# Patient Record
Sex: MALE | Race: WHITE | Employment: OTHER | URBAN - NONMETROPOLITAN AREA
[De-identification: names, ages, dates, MRNs, and addresses within clinical notes are randomized per-mention and may not be internally consistent; named-entity substitution may affect disease eponyms.]

---

## 2017-01-04 ENCOUNTER — PROCEDURE VISIT (OUTPATIENT)
Dept: CARDIOLOGY | Age: 82
End: 2017-01-04

## 2017-01-04 DIAGNOSIS — I50.22 CHRONIC SYSTOLIC CONGESTIVE HEART FAILURE (HCC): Primary | ICD-10-CM

## 2017-01-04 PROCEDURE — 93297 REM INTERROG DEV EVAL ICPMS: CPT | Performed by: INTERNAL MEDICINE

## 2017-02-01 ENCOUNTER — PROCEDURE VISIT (OUTPATIENT)
Dept: CARDIOLOGY | Age: 82
End: 2017-02-01

## 2017-02-01 DIAGNOSIS — I50.22 CHRONIC SYSTOLIC CONGESTIVE HEART FAILURE (HCC): Primary | ICD-10-CM

## 2017-02-01 PROCEDURE — 93297 REM INTERROG DEV EVAL ICPMS: CPT | Performed by: INTERNAL MEDICINE

## 2017-05-18 ENCOUNTER — OFFICE VISIT (OUTPATIENT)
Dept: CARDIOLOGY | Age: 82
End: 2017-05-18

## 2017-05-18 VITALS
WEIGHT: 184 LBS | HEIGHT: 70 IN | BODY MASS INDEX: 26.34 KG/M2 | DIASTOLIC BLOOD PRESSURE: 72 MMHG | SYSTOLIC BLOOD PRESSURE: 134 MMHG | HEART RATE: 63 BPM

## 2017-05-18 DIAGNOSIS — I50.22 CHRONIC SYSTOLIC CONGESTIVE HEART FAILURE (HCC): ICD-10-CM

## 2017-05-18 DIAGNOSIS — I42.9 CARDIOMYOPATHY (HCC): Primary | ICD-10-CM

## 2017-05-18 DIAGNOSIS — Z98.890 S/P CARDIAC CATH: ICD-10-CM

## 2017-05-18 DIAGNOSIS — I10 UNCONTROLLED HYPERTENSION: ICD-10-CM

## 2017-05-18 DIAGNOSIS — I48.92 ATRIAL FLUTTER, PAROXYSMAL (HCC): ICD-10-CM

## 2017-05-18 PROCEDURE — 4040F PNEUMOC VAC/ADMIN/RCVD: CPT | Performed by: INTERNAL MEDICINE

## 2017-05-18 PROCEDURE — G8420 CALC BMI NORM PARAMETERS: HCPCS | Performed by: INTERNAL MEDICINE

## 2017-05-18 PROCEDURE — 1036F TOBACCO NON-USER: CPT | Performed by: INTERNAL MEDICINE

## 2017-05-18 PROCEDURE — G8427 DOCREV CUR MEDS BY ELIG CLIN: HCPCS | Performed by: INTERNAL MEDICINE

## 2017-05-18 PROCEDURE — 1123F ACP DISCUSS/DSCN MKR DOCD: CPT | Performed by: INTERNAL MEDICINE

## 2017-05-18 PROCEDURE — 99213 OFFICE O/P EST LOW 20 MIN: CPT | Performed by: INTERNAL MEDICINE

## 2017-06-30 ENCOUNTER — TELEPHONE (OUTPATIENT)
Dept: CARDIOLOGY | Age: 82
End: 2017-06-30

## 2018-08-23 ENCOUNTER — OFFICE VISIT (OUTPATIENT)
Dept: CARDIOLOGY CLINIC | Age: 83
End: 2018-08-23
Payer: MEDICARE

## 2018-08-23 ENCOUNTER — NURSE ONLY (OUTPATIENT)
Dept: CARDIOLOGY CLINIC | Age: 83
End: 2018-08-23
Payer: MEDICARE

## 2018-08-23 VITALS
DIASTOLIC BLOOD PRESSURE: 57 MMHG | BODY MASS INDEX: 27.63 KG/M2 | HEIGHT: 70 IN | HEART RATE: 91 BPM | WEIGHT: 193 LBS | SYSTOLIC BLOOD PRESSURE: 100 MMHG

## 2018-08-23 DIAGNOSIS — I48.92 ATRIAL FLUTTER, PAROXYSMAL (HCC): ICD-10-CM

## 2018-08-23 DIAGNOSIS — I10 UNCONTROLLED HYPERTENSION: ICD-10-CM

## 2018-08-23 DIAGNOSIS — I47.1 ATRIAL TACHYCARDIA (HCC): ICD-10-CM

## 2018-08-23 DIAGNOSIS — I42.0 DILATED CARDIOMYOPATHY (HCC): Primary | ICD-10-CM

## 2018-08-23 DIAGNOSIS — I48.20 CHRONIC ATRIAL FIBRILLATION (HCC): ICD-10-CM

## 2018-08-23 DIAGNOSIS — Z95.810 S/P ICD (INTERNAL CARDIAC DEFIBRILLATOR) PROCEDURE: ICD-10-CM

## 2018-08-23 DIAGNOSIS — Z95.810 S/P ICD (INTERNAL CARDIAC DEFIBRILLATOR) PROCEDURE: Primary | ICD-10-CM

## 2018-08-23 DIAGNOSIS — Z98.890 S/P CARDIAC CATH: ICD-10-CM

## 2018-08-23 PROCEDURE — G8419 CALC BMI OUT NRM PARAM NOF/U: HCPCS | Performed by: INTERNAL MEDICINE

## 2018-08-23 PROCEDURE — 99214 OFFICE O/P EST MOD 30 MIN: CPT | Performed by: INTERNAL MEDICINE

## 2018-08-23 PROCEDURE — 1101F PT FALLS ASSESS-DOCD LE1/YR: CPT | Performed by: INTERNAL MEDICINE

## 2018-08-23 PROCEDURE — 1123F ACP DISCUSS/DSCN MKR DOCD: CPT | Performed by: INTERNAL MEDICINE

## 2018-08-23 PROCEDURE — G8427 DOCREV CUR MEDS BY ELIG CLIN: HCPCS | Performed by: INTERNAL MEDICINE

## 2018-08-23 PROCEDURE — 4040F PNEUMOC VAC/ADMIN/RCVD: CPT | Performed by: INTERNAL MEDICINE

## 2018-08-23 PROCEDURE — 93000 ELECTROCARDIOGRAM COMPLETE: CPT | Performed by: INTERNAL MEDICINE

## 2018-08-23 PROCEDURE — 1036F TOBACCO NON-USER: CPT | Performed by: INTERNAL MEDICINE

## 2018-08-23 PROCEDURE — 93283 PRGRMG EVAL IMPLANTABLE DFB: CPT | Performed by: INTERNAL MEDICINE

## 2018-08-23 RX ORDER — AMIODARONE HYDROCHLORIDE 200 MG/1
200 TABLET ORAL DAILY
COMMUNITY
End: 2018-08-23

## 2018-08-23 RX ORDER — GINSENG 100 MG
CAPSULE ORAL DAILY
COMMUNITY
End: 2018-12-03 | Stop reason: ALTCHOICE

## 2018-08-23 RX ORDER — DONEPEZIL HYDROCHLORIDE 5 MG/1
10 TABLET, FILM COATED ORAL NIGHTLY
COMMUNITY

## 2018-08-23 RX ORDER — LEVOFLOXACIN 500 MG/1
500 TABLET, FILM COATED ORAL DAILY
COMMUNITY
End: 2018-12-03 | Stop reason: ALTCHOICE

## 2018-08-23 NOTE — PROGRESS NOTES
[facility] dual icd  Dr Violette Moya [age] Years  Fib waves  0.1 mV  r waves 11.8mV  A paced 2.2%  V paced 6.0%  rv threshold  0.5V @ 0.40ms  Atrial impedance 342 ohms  Ventricle impedance 399 ohms, shock 27 ohms, and svc 36 ohms   Time in af 24 hr a day 100%

## 2018-08-23 NOTE — PROGRESS NOTES
children: N/A    Years of education: N/A     Occupational History    Not on file. Social History Main Topics    Smoking status: Never Smoker    Smokeless tobacco: Never Used    Alcohol use No    Drug use: No    Sexual activity: Not on file     Other Topics Concern    Not on file     Social History Narrative    No narrative on file       Current Outpatient Prescriptions   Medication Sig Dispense Refill    donepezil (ARICEPT) 5 MG tablet Take 5 mg by mouth nightly      bacitracin 500 UNIT/GM ointment Apply topically daily Apply topically 2 times daily.  Probiotic Product (PROBIOTIC DAILY PO) Take by mouth 2 times daily      levofloxacin (LEVAQUIN) 500 MG tablet Take 500 mg by mouth daily      aspirin 81 MG EC tablet Take 81 mg by mouth daily      terazosin (HYTRIN) 10 MG capsule Take 10 mg by mouth daily      pantoprazole (PROTONIX) 40 MG tablet Take 1 tablet by mouth daily 30 tablet 3    metoprolol (TOPROL-XL) 50 MG XL tablet Take 1 tablet by mouth 2 times daily 180 tablet 1    Iron TABS Take 65 mg by mouth daily. No current facility-administered medications for this visit. Review of Systems -     General ROS: negative  Psychological ROS: negative  Hematological and Lymphatic ROS: No history of blood clots or bleeding disorder. Respiratory ROS: no cough, shortness of breath, or wheezing  Cardiovascular ROS: no chest pain or dyspnea on exertion  Gastrointestinal ROS: negative  Genito-Urinary ROS: no dysuria, trouble voiding, or hematuria  Musculoskeletal ROS: negative  Neurological ROS: no TIA or stroke symptoms  Dermatological ROS: negative      Blood pressure (!) 100/57, pulse 91, height 5' 10\" (1.778 m), weight 193 lb (87.5 kg).         Physical Examination:    General appearance - alert, well appearing, and in no distress  Mental status - alert, oriented to person, place, and time  Neck - supple, no significant adenopathy, no JVD, or carotid bruits  Chest - clear to dilated. Mitral valve: There was moderate annular calcification. 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 COMPARISONS:  Comparison was made with the previous study of 10-Yasmany-2014. Ejection   fraction has not changed. RECOMMENDATIONS:  A CT scan of the thoracic aorta to assess for aortic aneurysm is   suggested. Prepared and signed by    Josh Chand MD  Signed 10-Sep-2014 20:38:44    EKG 10/27/14-Sinus  Rhythm  -First degree A-V block  - frequent multiform ectopic ventricular beats   Tanner = 238   # VECs = 3, # types 2  -Nonspecific ST depression   +   Nonspecific T-abnormality  -Nondiagnostic. ICD interrogation-revealed  Paroxysmal atrial flutter noted on ICD interrogation  · AT/AF >= 6 hr for 1 days. EKG 11/12/15-  Sinus  Rhythm   -  Nonspecific T-abnormality. ABNORMAL     EKG 8/23/18  Atrial flutter-fibrillation  - occasional ectopic ventricular beat     -Nonspecific ST depression   +   Nonspecific T-abnormality  -Nondiagnostic. ABNORMAL       Assessment     Diagnosis Orders   1. Dilated cardiomyopathy ([address]     2. Hypertension  EKG 12 Lead   3. Chronic atrial fibrillation ([address]     4. S/P ICD (internal cardiac defibrillator) procedure: 10/16/2014: Medtronic Dual Chamber. 5. S/P cardiac cath-7/9/14-LM-P, LAD  MID 30 TO 40%, DIAG PROX 40 TO 50% MID 30%, LCX MID 40%, OM1 MID 30%, RCA PROX 30%, MID 30%, PLV OSTIAL 50%, EDP 22, no LVG- MED RX     6. Atrial tachycardia - nonsustained with frequent PACs, no atrial flutter     7. Atrial flutter, paroxysmal (HCC) noted on the ICD interrogation- short lasting - med RX and cont asa         ASSESSMENT:   1. Acute systolic congestive heart failure, basically systolic and   diastolic in nature with shortness of breath, bilateral lower leg   swelling and chest x-ray abnormality and increased JVD. The patient   is appropriately on gentle diuresis. We will continue with gentle   diuresis.    2. This is a newly diagnosed cardiomyopathy with ejection fraction 30-35   percent with a background of newly diagnosed acute congestive heart   failure. 3. Hypertension, background history of long history of hypertension. 4. Anemia, microcystic, hypochromic with low MCV and low MCH, need to   evaluate blood loss and iron study needs to be performed. 5. Benign prostatic hypertrophy. 6. Impaired hearing. Plan     Recent admission for GI bleed  Family member with him she does not know much about him  And can not make decision  Advised to come with his kids for Future    Current Lab  Reviewed and doing well    Continue the current treatment and with constant vigilance to changes in symptoms and also any potential side effects. Return for care or seek medical attention immediately if symptoms got worse and/or develop new symptoms. Congestive heart failure: no evidence of fluid overload today, no recent hospitalization for CHF    Cardiomyopathy:Nonischemic  improving, no CHF symptoms, no change in clinical condition. Will need periodic echocardiograms depending on symptoms. CMP- NON-ISCHEMIC -OMT  And had ICD  Cont lisinopril  5 MG po qd  Cont current coreg dose  Echo recommended and pat declined     PA suresh- chiarads of 3 - need OA  Off  apixaban 5 mg po bid due to recent GI bleed  Risk and benefit of the OA explained and pat verbalized understanding and declined  He know that there is a risk of CVA    Hypertension, on medical treatment. Seems to be under good control. Patient is compliant with medical treatment.        D/w the pat the cath finding  Need icd intrrogation   Uma Mckeon agreed today to have after refusal since June 2017    D/w the pat and wife at length    [place] [id number]

## 2018-11-21 ENCOUNTER — TELEPHONE (OUTPATIENT)
Dept: CARDIOLOGY CLINIC | Age: 83
End: 2018-11-21

## 2018-12-03 ENCOUNTER — OFFICE VISIT (OUTPATIENT)
Dept: CARDIOLOGY CLINIC | Age: 83
End: 2018-12-03
Payer: MEDICARE

## 2018-12-03 ENCOUNTER — NURSE ONLY (OUTPATIENT)
Dept: CARDIOLOGY CLINIC | Age: 83
End: 2018-12-03
Payer: MEDICARE

## 2018-12-03 VITALS
SYSTOLIC BLOOD PRESSURE: 122 MMHG | DIASTOLIC BLOOD PRESSURE: 68 MMHG | BODY MASS INDEX: 24.77 KG/M2 | HEIGHT: 70 IN | HEART RATE: 60 BPM | WEIGHT: 173 LBS

## 2018-12-03 DIAGNOSIS — I48.20 CHRONIC ATRIAL FIBRILLATION (HCC): ICD-10-CM

## 2018-12-03 DIAGNOSIS — I50.42 CHRONIC COMBINED SYSTOLIC AND DIASTOLIC CONGESTIVE HEART FAILURE (HCC): ICD-10-CM

## 2018-12-03 DIAGNOSIS — I10 UNCONTROLLED HYPERTENSION: ICD-10-CM

## 2018-12-03 DIAGNOSIS — I42.0 DILATED CARDIOMYOPATHY (HCC): Primary | ICD-10-CM

## 2018-12-03 DIAGNOSIS — Z95.810 S/P ICD (INTERNAL CARDIAC DEFIBRILLATOR) PROCEDURE: ICD-10-CM

## 2018-12-03 DIAGNOSIS — Z98.890 S/P CARDIAC CATH: ICD-10-CM

## 2018-12-03 DIAGNOSIS — Z95.810 S/P ICD (INTERNAL CARDIAC DEFIBRILLATOR) PROCEDURE: Primary | ICD-10-CM

## 2018-12-03 PROCEDURE — G8484 FLU IMMUNIZE NO ADMIN: HCPCS | Performed by: INTERNAL MEDICINE

## 2018-12-03 PROCEDURE — 4040F PNEUMOC VAC/ADMIN/RCVD: CPT | Performed by: INTERNAL MEDICINE

## 2018-12-03 PROCEDURE — 1101F PT FALLS ASSESS-DOCD LE1/YR: CPT | Performed by: INTERNAL MEDICINE

## 2018-12-03 PROCEDURE — 93283 PRGRMG EVAL IMPLANTABLE DFB: CPT | Performed by: INTERNAL MEDICINE

## 2018-12-03 PROCEDURE — 1123F ACP DISCUSS/DSCN MKR DOCD: CPT | Performed by: INTERNAL MEDICINE

## 2018-12-03 PROCEDURE — G8420 CALC BMI NORM PARAMETERS: HCPCS | Performed by: INTERNAL MEDICINE

## 2018-12-03 PROCEDURE — G8427 DOCREV CUR MEDS BY ELIG CLIN: HCPCS | Performed by: INTERNAL MEDICINE

## 2018-12-03 PROCEDURE — 99214 OFFICE O/P EST MOD 30 MIN: CPT | Performed by: INTERNAL MEDICINE

## 2018-12-03 PROCEDURE — 1036F TOBACCO NON-USER: CPT | Performed by: INTERNAL MEDICINE

## 2018-12-03 RX ORDER — BUMETANIDE 1 MG/1
0.5 TABLET ORAL DAILY
COMMUNITY

## 2018-12-03 RX ORDER — POTASSIUM CHLORIDE 750 MG/1
10 TABLET, EXTENDED RELEASE ORAL 2 TIMES DAILY
Qty: 60 TABLET | Refills: 3
Start: 2018-12-03 | End: 2019-05-06

## 2018-12-03 RX ORDER — POTASSIUM CHLORIDE 750 MG/1
10 TABLET, EXTENDED RELEASE ORAL DAILY
COMMUNITY
End: 2018-12-03

## 2018-12-03 NOTE — PROGRESS NOTES
valve:  There was moderate annular calcification. 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 COMPARISONS:  Comparison was made with the previous study of 10-Yasmany-2014. Ejection   fraction has not changed. RECOMMENDATIONS:  A CT scan of the thoracic aorta to assess for aortic aneurysm is   suggested. Prepared and signed by    Yg Potter MD  Signed 10-Sep-2014 20:38:44    EKG 10/27/14-Sinus  Rhythm  -First degree A-V block  - frequent multiform ectopic ventricular beats   Tanner = 238   # VECs = 3, # types 2  -Nonspecific ST depression   +   Nonspecific T-abnormality  -Nondiagnostic. ICD interrogation-revealed  Paroxysmal atrial flutter noted on ICD interrogation  · AT/AF >= 6 hr for 1 days. EKG 11/12/15-  Sinus  Rhythm   -  Nonspecific T-abnormality. ABNORMAL     EKG 8/23/18  Atrial flutter-fibrillation  - occasional ectopic ventricular beat     -Nonspecific ST depression   +   Nonspecific T-abnormality  -Nondiagnostic. ABNORMAL       Assessment     Diagnosis Orders   1. Dilated cardiomyopathy ([address]     2. Chronic combined systolic and diastolic congestive heart failure ([address]     3. Chronic atrial fibrillation ([address]     4. Hypertension     5. S/P cardiac cath-7/9/14-LM-P, LAD  MID 30 TO 40%, DIAG PROX 40 TO 50% MID 30%, LCX MID 40%, OM1 MID 30%, RCA PROX 30%, MID 30%, PLV OSTIAL 50%, EDP 22, no LVG- MED RX     6. S/P ICD (internal cardiac defibrillator) procedure: 10/16/2014: Medtronic Dual Chamber. ASSESSMENT:   1. Acute systolic congestive heart failure, basically systolic and   diastolic in nature with shortness of breath, bilateral lower leg   swelling and chest x-ray abnormality and increased JVD. The patient   is appropriately on gentle diuresis. We will continue with gentle   diuresis.    2. This is a newly diagnosed cardiomyopathy with ejection fraction 30-35   percent with a background of newly diagnosed acute congestive heart

## 2018-12-03 NOTE — PROGRESS NOTES
[employer] dual icd  Dr Teddy Barrera 7. 4years  Atrial paced 0.5%  V Paced 8.5%  p wave - fib waves  r wave 17. 5mV  Ventricle threshold 0.5V @ 0.8ms  Atrial impedance 361 ohms  Ventricle impedance 361 ohms  RV 33 ohms  SVC 39 ohms  A fib burden 100%  Episodes of svt  Changes made from 0.4ms to 0.8ms in parameters

## 2019-02-12 ENCOUNTER — PROCEDURE VISIT (OUTPATIENT)
Dept: CARDIOLOGY CLINIC | Age: 84
End: 2019-02-12
Payer: MEDICARE

## 2019-02-12 DIAGNOSIS — Z95.810 S/P ICD (INTERNAL CARDIAC DEFIBRILLATOR) PROCEDURE: ICD-10-CM

## 2019-02-12 DIAGNOSIS — I50.20 SYSTOLIC CONGESTIVE HEART FAILURE, UNSPECIFIED HF CHRONICITY (HCC): Primary | ICD-10-CM

## 2019-02-12 PROCEDURE — 93297 REM INTERROG DEV EVAL ICPMS: CPT | Performed by: INTERNAL MEDICINE

## 2019-02-12 PROCEDURE — 93299 PR REM INTERROG ICPMS/SCRMS <30 D TECH REVIEW: CPT | Performed by: INTERNAL MEDICINE

## 2019-03-20 ENCOUNTER — PROCEDURE VISIT (OUTPATIENT)
Dept: CARDIOLOGY CLINIC | Age: 84
End: 2019-03-20
Payer: MEDICARE

## 2019-03-20 DIAGNOSIS — I50.20 SYSTOLIC CONGESTIVE HEART FAILURE, UNSPECIFIED HF CHRONICITY (HCC): Primary | ICD-10-CM

## 2019-03-20 PROCEDURE — 93297 REM INTERROG DEV EVAL ICPMS: CPT | Performed by: INTERNAL MEDICINE

## 2019-03-20 PROCEDURE — 93299 PR REM INTERROG ICPMS/SCRMS <30 D TECH REVIEW: CPT | Performed by: INTERNAL MEDICINE

## 2019-04-18 ENCOUNTER — OUTSIDE SERVICES (OUTPATIENT)
Dept: FAMILY MEDICINE CLINIC | Age: 84
End: 2019-04-18
Payer: MEDICARE

## 2019-04-18 VITALS
OXYGEN SATURATION: 97 % | SYSTOLIC BLOOD PRESSURE: 136 MMHG | WEIGHT: 175 LBS | BODY MASS INDEX: 25.11 KG/M2 | TEMPERATURE: 98 F | RESPIRATION RATE: 20 BRPM | DIASTOLIC BLOOD PRESSURE: 70 MMHG | HEART RATE: 72 BPM

## 2019-04-18 DIAGNOSIS — I48.20 CHRONIC ATRIAL FIBRILLATION (HCC): ICD-10-CM

## 2019-04-18 DIAGNOSIS — N40.1 BENIGN PROSTATIC HYPERPLASIA WITH URINARY HESITANCY: ICD-10-CM

## 2019-04-18 DIAGNOSIS — I50.20 SYSTOLIC CONGESTIVE HEART FAILURE, UNSPECIFIED HF CHRONICITY (HCC): ICD-10-CM

## 2019-04-18 DIAGNOSIS — Z95.810 S/P ICD (INTERNAL CARDIAC DEFIBRILLATOR) PROCEDURE: ICD-10-CM

## 2019-04-18 DIAGNOSIS — K92.2 LOWER GI BLEED: ICD-10-CM

## 2019-04-18 DIAGNOSIS — R39.11 BENIGN PROSTATIC HYPERPLASIA WITH URINARY HESITANCY: ICD-10-CM

## 2019-04-18 DIAGNOSIS — Z98.890 S/P CARDIAC CATH: ICD-10-CM

## 2019-04-18 DIAGNOSIS — D64.9 ANEMIA, UNSPECIFIED TYPE: ICD-10-CM

## 2019-04-18 DIAGNOSIS — I48.92 ATRIAL FLUTTER, PAROXYSMAL (HCC): ICD-10-CM

## 2019-04-18 DIAGNOSIS — I47.1 ATRIAL TACHYCARDIA (HCC): ICD-10-CM

## 2019-04-18 DIAGNOSIS — F03.90 DEMENTIA WITHOUT BEHAVIORAL DISTURBANCE, UNSPECIFIED DEMENTIA TYPE: Primary | ICD-10-CM

## 2019-04-18 DIAGNOSIS — I42.0 DILATED CARDIOMYOPATHY (HCC): ICD-10-CM

## 2019-04-18 DIAGNOSIS — I10 UNCONTROLLED HYPERTENSION: ICD-10-CM

## 2019-04-18 PROCEDURE — 99309 SBSQ NF CARE MODERATE MDM 30: CPT | Performed by: PHYSICAL MEDICINE & REHABILITATION

## 2019-04-18 ASSESSMENT — ENCOUNTER SYMPTOMS
SORE THROAT: 0
DIARRHEA: 0
CONSTIPATION: 0
COUGH: 0
VOMITING: 0
EYE REDNESS: 0
NAUSEA: 0
SHORTNESS OF BREATH: 0
WHEEZING: 0
RHINORRHEA: 0

## 2019-04-18 NOTE — PROGRESS NOTES
Bhaskar Carter is a 81 y.o. male who presents today for his medical conditions/complaints as noted below. Chief Complaint   Patient presents with    Other     monthly visit           HPI:     HPI   Patient is seen and examined today in his room for his chronic health conditions. He denies pain, short of breath or chest pain. Denies headache, nausea, and vomiting. Staff continue to monitor his behaviors after the addition of Depakote. He was quiet and non-talkative during his visit. His weight is stable at 175# this month. He has a PMH of HTN, GERD, diverticulitis, BPH, OA, right shoulder impingement syndrome, CHF, non-obstructive CAD, A-fib, aortic aneurysm, HTN, hematuria, dementia, reflux. He is VERY [place]. Past Medical History:   Diagnosis Date    CAD (coronary artery disease)     CHF (congestive heart failure) (HCC)     Hx of blood clots     groin    Hypertension     Paroxysmal atrial fibrillation ([facility] 10/24/2014    S/P ICD (internal cardiac defibrillator) procedure: 10/16/2014: Medtronic Dual Chamber. 10/16/2014    10/16/2014: Medtronic Dual Chamber.  Dr. Iris Erickson       Past Surgical History:   Procedure Laterality Date    CARDIAC CATHETERIZATION  7/9/14    COLONOSCOPY      x2    JOINT REPLACEMENT Bilateral     Hip replacement    OTHER SURGICAL HISTORY      mole removed       Family History   Problem Relation Age of Onset    Heart Disease Mother     Cancer Sister     Heart Disease Father        Social History     Tobacco Use    Smoking status: Never Smoker    Smokeless tobacco: Never Used   Substance Use Topics    Alcohol use: No      Current Outpatient Medications   Medication Sig Dispense Refill    bumetanide (BUMEX) 1 MG tablet Take 1 mg by mouth 2 times daily      potassium chloride (KLOR-CON M) 10 MEQ extended release tablet Take 1 tablet by mouth 2 times daily 60 tablet 3    donepezil (ARICEPT) 5 MG tablet Take 5 mg by mouth nightly      aspirin 81 MG EC tablet Take 81 mg by mouth daily      terazosin (HYTRIN) 10 MG capsule Take 10 mg by mouth daily      pantoprazole (PROTONIX) 40 MG tablet Take 1 tablet by mouth daily 30 tablet 3    metoprolol (TOPROL-XL) 50 MG XL tablet Take 1 tablet by mouth 2 times daily (Patient taking differently: Take 25 mg by mouth daily ) 180 tablet 1    Iron TABS Take 65 mg by mouth daily. No current facility-administered medications for this visit. No Known Allergies    Health Maintenance   Topic Date Due    DTaP/Tdap/Td vaccine (1 - Tdap) 01/15/1954    Shingles Vaccine (1 of 2) 01/15/1986    Pneumococcal 65+ years Vaccine (1 of 2 - PCV13) 01/15/2001    Potassium monitoring  08/10/2017    Creatinine monitoring  08/10/2017    Flu vaccine (Season Ended) 09/01/2019       Subjective:      Review of Systems   Constitutional: Negative for chills, fatigue and fever. HENT: Negative for congestion, rhinorrhea and sore throat. Eyes: Negative for redness and visual disturbance. Respiratory: Negative for cough, shortness of breath and wheezing. Cardiovascular: Negative for chest pain and leg swelling. Gastrointestinal: Negative for constipation, diarrhea, nausea and vomiting. Endocrine: Negative for polydipsia and polyuria. Genitourinary: Negative for dysuria, frequency and hematuria. Musculoskeletal: Negative for arthralgias, gait problem and myalgias. Skin: Negative for rash and wound. Allergic/Immunologic: Negative for environmental allergies and immunocompromised state. Neurological: Negative for dizziness, light-headedness and headaches. Hematological: Does not bruise/bleed easily. Psychiatric/Behavioral: Negative for dysphoric mood and sleep disturbance. The patient is not nervous/anxious. Objective:     Physical Exam   Constitutional: He is oriented to person, place, and time. He appears well-developed and well-nourished. No distress. HENT:   Head: Normocephalic and atraumatic.    Right Ear: External ear normal.   Left Ear: External ear normal.   Nose: Nose normal.   Mouth/Throat: Oropharynx is clear and moist and mucous membranes are normal.   Eyes: Conjunctivae and EOM are normal. Right eye exhibits no discharge. Left eye exhibits no discharge. No scleral icterus. Neck: Neck supple. No JVD present. No thyromegaly present. Cardiovascular: Normal rate, regular rhythm, normal heart sounds and intact distal pulses. Pulmonary/Chest: Effort normal and breath sounds normal. No stridor. No respiratory distress. He has no wheezes. He has no rales. Abdominal: Soft. Bowel sounds are normal. He exhibits no distension. There is no tenderness. Musculoskeletal: Normal range of motion. He exhibits no edema or deformity. Right shoulder: He exhibits no tenderness, no bony tenderness and no pain. Left shoulder: He exhibits no tenderness, no bony tenderness and no pain. Cervical back: He exhibits no tenderness, no bony tenderness and no pain. Thoracic back: He exhibits no tenderness, no bony tenderness, no pain and no spasm. Lumbar back: He exhibits no tenderness, no bony tenderness and no pain. Lymphadenopathy:     He has no cervical adenopathy. Right: No supraclavicular adenopathy present. Left: No supraclavicular adenopathy present. Neurological: He is alert and oriented to person, place, and time. He displays no atrophy. He exhibits normal muscle tone. He displays no seizure activity. Skin: Skin is warm, dry and intact. No rash noted. No erythema. Psychiatric: He has a normal mood and affect. His speech is normal and behavior is normal. Cognition and memory are normal.   Nursing note and vitals reviewed. VITALS  /70   Pulse 72   Temp 98 °F (36.7 °C)   Resp 20   Wt 175 lb (79.4 kg)   SpO2 97%   BMI 25.11 kg/m²     Assessment/Plan:   1. A-fib--chronic and stable; continue Metoprolol and ASA. Unable to use anticoagulants due to falls.    2. CHF--chronic; continue Bumex, potassium,and MARCELO diet. Weight stable. Will monitor. [facility]. 3.  ASHD--chronic and stable; continue ASA. No s/s.   4. BPH--chronic and stable; continue Terazosin  5. HTN--chronic and stable; continue Metoprolol, Bumex and Potassium. 6.  GERD--chronic and stable; continue Pantoprazole  7. Dementia--chronic; continue Aricept. 8. Anemia- Chronic and stable. Ferrous sulfate. labs stable. 9. Depression - Continue Zoloft 25mg for 7 days, then increase to 50mg. PHQ-9 to be assessed. Continue Depakote for behaviors.     Marcell Costello M.D.

## 2019-04-24 ENCOUNTER — PROCEDURE VISIT (OUTPATIENT)
Dept: CARDIOLOGY CLINIC | Age: 84
End: 2019-04-24
Payer: MEDICARE

## 2019-04-24 DIAGNOSIS — Z95.810 S/P ICD (INTERNAL CARDIAC DEFIBRILLATOR) PROCEDURE: Primary | ICD-10-CM

## 2019-04-24 PROCEDURE — 93296 REM INTERROG EVL PM/IDS: CPT | Performed by: INTERNAL MEDICINE

## 2019-04-24 PROCEDURE — 93295 DEV INTERROG REMOTE 1/2/MLT: CPT | Performed by: INTERNAL MEDICINE

## 2019-04-24 NOTE — PROGRESS NOTES
At fib, consider VVIR with next in office check   At imped 399  shock 38 SVC 42  R waves 14.1  0.6% atrial paced 22.7% RVP  optivol WNL

## 2019-05-03 LAB
BUN BLDV-MCNC: 20 MG/DL
CALCIUM SERPL-MCNC: 8.1 MG/DL
CHLORIDE BLD-SCNC: 98 MMOL/L
CO2: 32 MMOL/L
CREAT SERPL-MCNC: 1.1 MG/DL
GFR CALCULATED: NORMAL
GLUCOSE BLD-MCNC: 73 MG/DL
MAGNESIUM: 1.9 MG/DL
POTASSIUM SERPL-SCNC: 3.3 MMOL/L
SODIUM BLD-SCNC: 138 MMOL/L

## 2019-05-06 ENCOUNTER — OFFICE VISIT (OUTPATIENT)
Dept: CARDIOLOGY CLINIC | Age: 84
End: 2019-05-06
Payer: MEDICARE

## 2019-05-06 VITALS
DIASTOLIC BLOOD PRESSURE: 58 MMHG | HEIGHT: 68 IN | SYSTOLIC BLOOD PRESSURE: 90 MMHG | BODY MASS INDEX: 26.22 KG/M2 | WEIGHT: 173 LBS | HEART RATE: 51 BPM

## 2019-05-06 DIAGNOSIS — I42.0 DILATED CARDIOMYOPATHY (HCC): ICD-10-CM

## 2019-05-06 DIAGNOSIS — I50.22 CHRONIC SYSTOLIC CONGESTIVE HEART FAILURE (HCC): Primary | ICD-10-CM

## 2019-05-06 DIAGNOSIS — Z95.810 S/P ICD (INTERNAL CARDIAC DEFIBRILLATOR) PROCEDURE: ICD-10-CM

## 2019-05-06 DIAGNOSIS — I10 UNCONTROLLED HYPERTENSION: ICD-10-CM

## 2019-05-06 DIAGNOSIS — Z98.890 S/P CARDIAC CATH: ICD-10-CM

## 2019-05-06 DIAGNOSIS — I48.20 CHRONIC ATRIAL FIBRILLATION (HCC): ICD-10-CM

## 2019-05-06 PROCEDURE — 1123F ACP DISCUSS/DSCN MKR DOCD: CPT | Performed by: INTERNAL MEDICINE

## 2019-05-06 PROCEDURE — G8419 CALC BMI OUT NRM PARAM NOF/U: HCPCS | Performed by: INTERNAL MEDICINE

## 2019-05-06 PROCEDURE — 1036F TOBACCO NON-USER: CPT | Performed by: INTERNAL MEDICINE

## 2019-05-06 PROCEDURE — 99214 OFFICE O/P EST MOD 30 MIN: CPT | Performed by: INTERNAL MEDICINE

## 2019-05-06 PROCEDURE — G8427 DOCREV CUR MEDS BY ELIG CLIN: HCPCS | Performed by: INTERNAL MEDICINE

## 2019-05-06 PROCEDURE — 4040F PNEUMOC VAC/ADMIN/RCVD: CPT | Performed by: INTERNAL MEDICINE

## 2019-05-06 RX ORDER — TERAZOSIN 5 MG/1
5 CAPSULE ORAL NIGHTLY
Qty: 30 CAPSULE | Refills: 3
Start: 2019-05-06

## 2019-05-06 RX ORDER — DIVALPROEX SODIUM 250 MG/1
250 TABLET, EXTENDED RELEASE ORAL 3 TIMES DAILY
COMMUNITY

## 2019-05-06 RX ORDER — POTASSIUM CHLORIDE 750 MG/1
20 TABLET, EXTENDED RELEASE ORAL 2 TIMES DAILY
Qty: 180 TABLET | Refills: 5
Start: 2019-05-06

## 2019-05-06 NOTE — PROGRESS NOTES
Chief Complaint   Patient presents with   Juan Luis Luna   Originally Seen and evaluated in the hospital for CHF and found to have new low EF-treated and D/C  And discharged. REASON FOR CONSULTATION: Reduced LV systolic function with ejection fraction   30 percent in a 67year-old gentleman admitted with acute newly diagnosed   congestive heart failure exacerbation. Had a cath 7/9/14, no stents. Pt here for 5 mo check up   Pt son assist patient with answering questions,   patient hard of hearing,   Son by his side    No dizziness   BP low normal    PATIENT DENIES CP, SOB, PALPITATIONS, DIZZY, LIGHTHEADED AND PERIPHERAL EDEMA. unchanged      Had  ICD placement   Patient Seen, Chart, Consults notes, Labs, Radiology studies reviewed. Patient Active Problem List   Diagnosis    CHF (congestive heart failure) (HCC) systolic well compensated and stable NYHA class II/III    Hypertension    Anemia    BPH (benign prostatic hyperplasia)    Cardiomyopathy ([facility] [address] EF 30%- 06/2014 and repeat ECHO 09/2014 EF 25%- had  ICD    S/P cardiac cath-7/9/14-LM-P, LAD  MID 30 TO 40%, DIAG PROX 40 TO 50% MID 30%, LCX MID 40%, OM1 MID 30%, RCA PROX 30%, MID 30%, PLV OSTIAL 50%, EDP 22, no LVG- MED RX    S/P ICD (internal cardiac defibrillator) procedure: 10/16/2014: Medtronic Dual Chamber.     Atrial tachycardia - nonsustained with frequent PACs, no atrial flutter    Atrial flutter, paroxysmal (HCC) noted on the ICD interrogation- short lasting - med RX and cont asa    Lower GI bleed    Chronic atrial fibrillation (HCC)    Dementia without behavioral disturbance       Past Surgical History:   Procedure Laterality Date    CARDIAC CATHETERIZATION  7/9/14    COLONOSCOPY      x2    JOINT REPLACEMENT Bilateral     Hip replacement    OTHER SURGICAL HISTORY      mole removed       No Known Allergies     Family History   Problem Relation Age of Onset    Heart Disease Mother     Cancer Sister     Heart Disease Father         Social History     Socioeconomic History    Marital status:      Spouse name: Not on file    Number of children: Not on file    Years of education: Not on file    Highest education level: Not on file   Occupational History    Not on file   Social Needs    Financial resource strain: Not on file    Food insecurity:     Worry: Not on file     Inability: Not on file    Transportation needs:     Medical: Not on file     Non-medical: Not on file   Tobacco Use    Smoking status: Never Smoker    Smokeless tobacco: Never Used   Substance and Sexual Activity    Alcohol use: No    Drug use: No    Sexual activity: Not on file   Lifestyle    Physical activity:     Days per week: Not on file     Minutes per session: Not on file    Stress: Not on file   Relationships    Social connections:     Talks on phone: Not on file     Gets together: Not on file     Attends Synagogue service: Not on file     Active member of club or organization: Not on file     Attends meetings of clubs or organizations: Not on file     Relationship status: Not on file    Intimate partner violence:     Fear of current or ex partner: Not on file     Emotionally abused: Not on file     Physically abused: Not on file     Forced sexual activity: Not on file   Other Topics Concern    Not on file   Social History Narrative    Not on file       Current Outpatient Medications   Medication Sig Dispense Refill    divalproex (DEPAKOTE ER) 250 MG extended release tablet Take 250 mg by mouth 3 times daily      sertraline (ZOLOFT) 50 MG tablet Take 50 mg by mouth daily      terazosin (HYTRIN) 5 MG capsule Take 1 capsule by mouth nightly 30 capsule 3    bumetanide (BUMEX) 1 MG tablet Take 1 mg by mouth 2 times daily      potassium chloride (KLOR-CON M) 10 MEQ extended release tablet Take 1 tablet by mouth 2 times daily (Patient taking differently: Take 10 mEq by mouth 3 times daily ) 60 tablet 3    donepezil (ARICEPT) 5 MG tablet Take 5 mg by mouth nightly      aspirin 81 MG EC tablet Take 81 mg by mouth daily      pantoprazole (PROTONIX) 40 MG tablet Take 1 tablet by mouth daily 30 tablet 3    Iron TABS Take 65 mg by mouth daily. No current facility-administered medications for this visit. Review of Systems -     General ROS: negative  Psychological ROS: negative  Hematological and Lymphatic ROS: No history of blood clots or bleeding disorder. Respiratory ROS: no cough, shortness of breath, or wheezing  Cardiovascular ROS: no chest pain or dyspnea on exertion  Gastrointestinal ROS: negative  Genito-Urinary ROS: no dysuria, trouble voiding, or hematuria  Musculoskeletal ROS: negative  Neurological ROS: no TIA or stroke symptoms  Dermatological ROS: negative      Blood pressure (!) 90/58, pulse 51, height 5' 8\" (1.727 m), weight 173 lb (78.5 kg). Physical Examination:    General appearance - alert, well appearing, and in no distress  Mental status - alert, oriented to person, place, and time  Neck - supple, no significant adenopathy, no JVD, or carotid bruits  Chest - clear to auscultation, no wheezes, rales or rhonchi, symmetric air entry  Heart - normal rate, regular rhythm, normal S1, S2, no murmurs, rubs, clicks or gallops  Abdomen - soft, nontender, nondistended, no masses or organomegaly  Neurological - alert, oriented, normal speech, no focal findings or movement disorder noted  Musculoskeletal - no joint tenderness, deformity or swelling  Extremities - peripheral pulses normal, no pedal edema, no clubbing or cyanosis  Skin - normal coloration and turgor, no rashes, no suspicious skin lesions noted    Lab  No results for input(s): CKTOTAL, CKMB, CKMBINDEX, TROPONINI in the last 72 hours.   CBC:   Lab Results   Component Value Date    WBC 6.3 08/11/2016    RBC 3.02 08/11/2016    HGB 9.9 08/11/2016    HCT 29.4 08/11/2016    MCV 97.6 08/11/2016    MCH 32.7 08/11/2016    MCHC 33.5 08/11/2016    RDW 13.9 08/11/2016     08/11/2016    MPV 9.6 08/11/2016     BMP:    Lab Results   Component Value Date     08/10/2016    K 3.8 08/10/2016     08/10/2016    CO2 21 08/10/2016    BUN 24 08/10/2016    LABALBU 3.2 08/10/2016    CREATININE 1.2 08/10/2016    CALCIUM 8.5 08/10/2016    LABGLOM 58 08/10/2016    GLUCOSE 92 08/10/2016     Hepatic Function Panel:    Lab Results   Component Value Date    ALKPHOS 45 08/10/2016     Magnesium:    Lab Results   Component Value Date    MG 2.5 04/28/2015     Warfarin PT/INR:    No components found for: PTPATWAR,  PTINRWAR  HgBA1c:    No results found for: LABA1C  FLP:    No components found for: CHLPL,  TRIG,  HDL,  LDLCALC,  LDLDIRECT,  LABVLDL  TSH:    Lab Results   Component Value Date    TSH 5.810 06/09/2014     SUMMARY:    Left ventricle: The ventricle was mildly dilated. Systolic function was severely reduced. Ejection fraction was estimated to   be 25 %. There was severe diffuse hypokinesis. Wall thickness was mildly increased. The changes were consistent with eccentric hypertrophy. Left atrium:  The atrium was moderately dilated. Mitral valve: There was moderate annular calcification. 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 COMPARISONS:  Comparison was made with the previous study of 10-Yasmany-2014. Ejection   fraction has not changed. RECOMMENDATIONS:  A CT scan of the thoracic aorta to assess for aortic aneurysm is   suggested. Prepared and signed by    Katerina Pate MD  Signed 10-Sep-2014 20:38:44    EKG 10/27/14-Sinus  Rhythm  -First degree A-V block  - frequent multiform ectopic ventricular beats   Tanner = 238   # VECs = 3, # types 2  -Nonspecific ST depression   +   Nonspecific T-abnormality  -Nondiagnostic. ICD interrogation-revealed  Paroxysmal atrial flutter noted on ICD interrogation  · AT/AF >= 6 hr for 1 days. EKG 11/12/15-  Sinus  Rhythm   -  Nonspecific T-abnormality. ABNORMAL     EKG 8/23/18  Atrial flutter-fibrillation  - occasional ectopic ventricular beat     -Nonspecific ST depression   +   Nonspecific T-abnormality  -Nondiagnostic. ABNORMAL       Assessment     Diagnosis Orders   1. Chronic systolic congestive heart failure ([facility] [address]     2. Dilated cardiomyopathy ([facility] [address]     3. Chronic atrial fibrillation ([facility] [address]     4. Hypertension     5. S/P cardiac cath-7/9/14-LM-P, LAD  MID 30 TO 40%, DIAG PROX 40 TO 50% MID 30%, LCX MID 40%, OM1 MID 30%, RCA PROX 30%, MID 30%, PLV OSTIAL 50%, EDP 22, no LVG- MED RX     6. S/P ICD (internal cardiac defibrillator) procedure: 10/16/2014: Medtronic Dual Chamber. ASSESSMENT:   1. Acute systolic congestive heart failure, basically systolic and   diastolic in nature with shortness of breath, bilateral lower leg   swelling and chest x-ray abnormality and increased JVD. The patient   is appropriately on gentle diuresis. We will continue with gentle   diuresis. 2. This is a newly diagnosed cardiomyopathy with ejection fraction 30-35   percent with a background of newly diagnosed acute congestive heart   failure. 3. Hypertension, background history of long history of hypertension. 4. Anemia, microcystic, hypochromic with low MCV and low MCH, need to   evaluate blood loss and iron study needs to be performed. 5. Benign prostatic hypertrophy. 6. Impaired hearing. Plan     The meds and labs reviewed      Hx of admission for GI bleed  Family member with him she does not know much about him  And can not make decision  Advised to come with his kids for Future      Continue the current treatment and with constant vigilance to changes in symptoms and also any potential side effects. Return for care or seek medical attention immediately if symptoms got worse and/or develop new symptoms.     Congestive heart failure: no evidence of fluid overload today, no recent hospitalization for CHF  Cont bumex  Cont  kcl     Low normal BP    Cardiomyopathy:Nonischemic  improving, no CHF symptoms, no change in clinical condition. Will need periodic echocardiograms depending on symptoms. CMP- NON-ISCHEMIC -OMT  And had ICD  Off lisinopril  5 MG po qd at [facility], [place], operated by [facility] doc  Cont current coreg dose  Echo recommended and pat declined     PA flutter- chads of 3 - need OA  Chronic atr fib  Off  apixaban 5 mg po bid due to recent GI bleed  Risk and benefit of the OA explained and pat verbalized understanding and declined  He know that there is a risk of CVA    Hypertension, on medical treatment. Seems to be under good control. Patient is compliant with medical treatment.    Low normal BP  Decrease hytrin to 5 mg po from 10  Hold for SBP< 110 mmhg    Sinus silva  Stop toprol xl 25 po qd    Hypokalemia  K 3.3   Increase kcl to 20 bid from tid  After 40 po x1 tioday  F/U at [facility], [place], operated by [facility]    The icd intrrogation look good done  4/2019-    D/w the pat and son at length    BMP and Mg prior to next visit    D/w the pat and the son plan of care    RTC in [address] [place] [address]

## 2019-05-16 ENCOUNTER — OUTSIDE SERVICES (OUTPATIENT)
Dept: FAMILY MEDICINE CLINIC | Age: 84
End: 2019-05-16
Payer: MEDICARE

## 2019-05-16 VITALS
TEMPERATURE: 98.4 F | WEIGHT: 174 LBS | OXYGEN SATURATION: 95 % | DIASTOLIC BLOOD PRESSURE: 70 MMHG | HEART RATE: 70 BPM | BODY MASS INDEX: 26.46 KG/M2 | RESPIRATION RATE: 18 BRPM | SYSTOLIC BLOOD PRESSURE: 130 MMHG

## 2019-05-16 DIAGNOSIS — I42.0 DILATED CARDIOMYOPATHY (HCC): ICD-10-CM

## 2019-05-16 DIAGNOSIS — D64.9 ANEMIA, UNSPECIFIED TYPE: Primary | ICD-10-CM

## 2019-05-16 DIAGNOSIS — F03.91 DEMENTIA WITH BEHAVIORAL DISTURBANCE, UNSPECIFIED DEMENTIA TYPE: ICD-10-CM

## 2019-05-16 DIAGNOSIS — I50.20 SYSTOLIC CONGESTIVE HEART FAILURE, UNSPECIFIED HF CHRONICITY (HCC): ICD-10-CM

## 2019-05-16 DIAGNOSIS — I48.92 ATRIAL FLUTTER, PAROXYSMAL (HCC): ICD-10-CM

## 2019-05-16 DIAGNOSIS — N40.1 BENIGN PROSTATIC HYPERPLASIA WITH URINARY HESITANCY: ICD-10-CM

## 2019-05-16 DIAGNOSIS — I10 UNCONTROLLED HYPERTENSION: ICD-10-CM

## 2019-05-16 DIAGNOSIS — R39.11 BENIGN PROSTATIC HYPERPLASIA WITH URINARY HESITANCY: ICD-10-CM

## 2019-05-16 PROCEDURE — 99309 SBSQ NF CARE MODERATE MDM 30: CPT | Performed by: NURSE PRACTITIONER

## 2019-05-16 ASSESSMENT — ENCOUNTER SYMPTOMS
EYE REDNESS: 0
SORE THROAT: 0
NAUSEA: 0
DIARRHEA: 0
SHORTNESS OF BREATH: 0
CONSTIPATION: 0
WHEEZING: 0
VOMITING: 0
RHINORRHEA: 0
COUGH: 0

## 2019-05-16 NOTE — PROGRESS NOTES
Major Old is a 81 y.o. male who presents today for his medical conditions/complaints as noted below. Chief Complaint   Patient presents with    Other     Follow up on chronic care conditions           HPI:     HPI    Ernie Miles is seen in his room today for follow up on his chronic conditions. He has no complaints today. States he is feeling well. He has had one fall in the past month. BMP was reviewed from 5/3. Past Medical History:   Diagnosis Date    CAD (coronary artery disease)     CHF (congestive heart failure) (HCC)     Hx of blood clots     groin    Hypertension     Paroxysmal atrial fibrillation ([facility] [address] 10/24/2014    S/P ICD (internal cardiac defibrillator) procedure: 10/16/2014: Medtronic Dual Chamber. 10/16/2014    10/16/2014: Medtronic Dual Chamber.  Dr. Emmie Erickson       Past Surgical History:   Procedure Laterality Date    CARDIAC CATHETERIZATION  7/9/14    COLONOSCOPY      x2    JOINT REPLACEMENT Bilateral     Hip replacement    OTHER SURGICAL HISTORY      mole removed       Family History   Problem Relation Age of Onset    Heart Disease Mother     Cancer Sister     Heart Disease Father        Social History     Tobacco Use    Smoking status: Never Smoker    Smokeless tobacco: Never Used   Substance Use Topics    Alcohol use: No      Current Outpatient Medications   Medication Sig Dispense Refill    divalproex (DEPAKOTE ER) 250 MG extended release tablet Take 250 mg by mouth 3 times daily      sertraline (ZOLOFT) 50 MG tablet Take 25 mg by mouth daily       terazosin (HYTRIN) 5 MG capsule Take 1 capsule by mouth nightly 30 capsule 3    potassium chloride (KLOR-CON M) 10 MEQ extended release tablet Take 2 tablets by mouth 2 times daily 180 tablet 5    bumetanide (BUMEX) 1 MG tablet Take 1 mg by mouth 2 times daily      donepezil (ARICEPT) 5 MG tablet Take 5 mg by mouth nightly      aspirin 81 MG EC tablet Take 81 mg by mouth daily      pantoprazole (PROTONIX) 40 MG tablet Take 1 tablet by mouth daily 30 tablet 3    Iron TABS Take 65 mg by mouth daily. No current facility-administered medications for this visit. No Known Allergies    Health Maintenance   Topic Date Due    DTaP/Tdap/Td vaccine (1 - Tdap) 01[birth year]15/1955    Shingles Vaccine (1 of 2) 01/15/1986    Pneumococcal 65+ years Vaccine (1 of 2 - PCV13) 01/15/2001    Flu vaccine (Season Ended) 09/01/2019    Potassium monitoring  05/03/2020    Creatinine monitoring  05/03/2020       Subjective:      Review of Systems   Constitutional: Negative for chills, fatigue and fever. HENT: Positive for hearing loss (hearing aide at all times). Negative for congestion, rhinorrhea and sore throat. Eyes: Positive for visual disturbance. Negative for redness. Respiratory: Negative for cough, shortness of breath and wheezing. Cardiovascular: Negative for chest pain and leg swelling. Gastrointestinal: Negative for constipation, diarrhea, nausea and vomiting. Endocrine: Negative for polydipsia and polyuria. Genitourinary: Negative for dysuria, frequency and hematuria. Musculoskeletal: Positive for gait problem (uses walker for ambulation). Negative for arthralgias and myalgias. Skin: Negative for rash and wound. Allergic/Immunologic: Negative for environmental allergies and immunocompromised state. Neurological: Negative for dizziness, light-headedness and headaches. Hematological: Does not bruise/bleed easily. Psychiatric/Behavioral: Negative for dysphoric mood and sleep disturbance. The patient is not nervous/anxious. Objective:     Physical Exam   Constitutional: He appears well-developed and well-nourished. No distress. HENT:   Head: Normocephalic and atraumatic.    Right Ear: External ear normal.   Left Ear: External ear normal.   Nose: Nose normal.   Mouth/Throat: Oropharynx is clear and moist and mucous membranes are normal.   Eyes: Conjunctivae and EOM are normal. Right eye exhibits no discharge. Left eye exhibits no discharge. No scleral icterus. Neck: Neck supple. No JVD present. No thyromegaly present. Cardiovascular: Normal rate, regular rhythm, normal heart sounds and intact distal pulses. Pulmonary/Chest: Effort normal and breath sounds normal. No stridor. No respiratory distress. He has no wheezes. He has no rales. Abdominal: Soft. Bowel sounds are normal. He exhibits no distension. There is no tenderness. Musculoskeletal: Normal range of motion. He exhibits no edema or deformity. Right shoulder: He exhibits no tenderness, no bony tenderness and no pain. Left shoulder: He exhibits no tenderness, no bony tenderness and no pain. Cervical back: He exhibits no tenderness, no bony tenderness and no pain. Thoracic back: He exhibits no tenderness, no bony tenderness, no pain and no spasm. Lumbar back: He exhibits no tenderness, no bony tenderness and no pain. Lymphadenopathy:     He has no cervical adenopathy. Right: No supraclavicular adenopathy present. Left: No supraclavicular adenopathy present. Neurological: He is alert. He is disoriented. He displays no atrophy. He exhibits normal muscle tone. He displays no seizure activity. Skin: Skin is warm, dry and intact. No rash noted. No erythema. Psychiatric: He has a normal mood and affect. His speech is normal. He is aggressive (at times per staff interview). Cognition and memory are impaired. He expresses impulsivity. Nursing note and vitals reviewed. /70   Pulse 70   Temp 98.4 °F (36.9 °C)   Resp 18   Wt 174 lb (78.9 kg)   SpO2 95%   BMI 26.46 kg/m²     Assessment:       Diagnosis Orders   1. Anemia, unspecified type     2. Atrial flutter, paroxysmal (HCC) noted on the ICD interrogation- short lasting - med RX and cont asa     3. Benign prostatic hyperplasia with urinary hesitancy     4. Dilated cardiomyopathy ([address]     5.  Systolic congestive heart failure, unspecified HF chronicity ([facility] [address]     6. Hypertension     7. Dementia with behavioral disturbance, unspecified dementia type         Plan:     1. A-fib--chronic and stable; continue Metoprolol and ASA. Unable to use anticoagulants due to falls. 2.  CHF--chronic; continue Bumex, potassium,and MARCELO diet. Potassium mildly low with increase. Will recheck next week. 3.  ASHD--chronic and stable; continue ASA. No s/s.   4. BPH--chronic and stable; continue Terazosin  5. HTN--chronic and stable; continue Metoprolol, Bumex and Potassium. 6.  GERD--chronic and stable; continue Pantoprazole  7. Dementia--chronic; continue Aricept and depakote  8. Anemia- Chronic and stable. Ferrous sulfate. labs stable. 9. Depression - Continue Zoloft. Mood stable.         Electronically signed by SARAHI Coffman CNP on 5/16/2019 at 12:58 PM

## 2019-05-29 ENCOUNTER — PROCEDURE VISIT (OUTPATIENT)
Dept: CARDIOLOGY CLINIC | Age: 84
End: 2019-05-29
Payer: MEDICARE

## 2019-05-29 DIAGNOSIS — I50.22 CHRONIC SYSTOLIC CONGESTIVE HEART FAILURE (HCC): Primary | ICD-10-CM

## 2019-05-29 PROCEDURE — 93299 PR REM INTERROG ICPMS/SCRMS <30 D TECH REVIEW: CPT | Performed by: INTERNAL MEDICINE

## 2019-05-29 PROCEDURE — 93297 REM INTERROG DEV EVAL ICPMS: CPT | Performed by: INTERNAL MEDICINE

## 2019-06-19 ENCOUNTER — OUTSIDE SERVICES (OUTPATIENT)
Dept: FAMILY MEDICINE CLINIC | Age: 84
End: 2019-06-19
Payer: MEDICARE

## 2019-06-19 VITALS
TEMPERATURE: 98.2 F | DIASTOLIC BLOOD PRESSURE: 72 MMHG | OXYGEN SATURATION: 95 % | RESPIRATION RATE: 18 BRPM | SYSTOLIC BLOOD PRESSURE: 126 MMHG | WEIGHT: 172 LBS | BODY MASS INDEX: 26.15 KG/M2 | HEART RATE: 73 BPM

## 2019-06-19 DIAGNOSIS — D64.9 ANEMIA, UNSPECIFIED TYPE: ICD-10-CM

## 2019-06-19 DIAGNOSIS — I25.10 ASHD (ARTERIOSCLEROTIC HEART DISEASE): ICD-10-CM

## 2019-06-19 DIAGNOSIS — I48.20 CHRONIC ATRIAL FIBRILLATION (HCC): Primary | ICD-10-CM

## 2019-06-19 DIAGNOSIS — I50.20 SYSTOLIC CONGESTIVE HEART FAILURE, UNSPECIFIED HF CHRONICITY (HCC): ICD-10-CM

## 2019-06-19 DIAGNOSIS — R39.11 BENIGN PROSTATIC HYPERPLASIA WITH URINARY HESITANCY: ICD-10-CM

## 2019-06-19 DIAGNOSIS — N40.1 BENIGN PROSTATIC HYPERPLASIA WITH URINARY HESITANCY: ICD-10-CM

## 2019-06-19 DIAGNOSIS — I10 UNCONTROLLED HYPERTENSION: ICD-10-CM

## 2019-06-19 DIAGNOSIS — K21.9 GASTROESOPHAGEAL REFLUX DISEASE WITHOUT ESOPHAGITIS: ICD-10-CM

## 2019-06-19 DIAGNOSIS — F32.89 OTHER DEPRESSION: ICD-10-CM

## 2019-06-19 DIAGNOSIS — F03.91 DEMENTIA WITH BEHAVIORAL DISTURBANCE, UNSPECIFIED DEMENTIA TYPE: ICD-10-CM

## 2019-06-19 PROBLEM — F32.A DEPRESSION: Status: ACTIVE | Noted: 2019-06-19

## 2019-06-19 PROBLEM — F03.918 DEMENTIA WITH BEHAVIORAL DISTURBANCE: Status: ACTIVE | Noted: 2019-04-18

## 2019-06-19 PROCEDURE — 99309 SBSQ NF CARE MODERATE MDM 30: CPT | Performed by: NURSE PRACTITIONER

## 2019-06-19 ASSESSMENT — ENCOUNTER SYMPTOMS
DIARRHEA: 0
VOMITING: 0
EYE REDNESS: 0
SORE THROAT: 0
NAUSEA: 0
WHEEZING: 0
RHINORRHEA: 0
CONSTIPATION: 0
SHORTNESS OF BREATH: 0
COUGH: 0

## 2019-06-19 NOTE — PROGRESS NOTES
Minnie Jacob is a 81 y.o. male who presents today for his medical conditions/complaints as noted below. Chief Complaint   Patient presents with    1 Month Follow-Up     Dementia           HPI:         Luis Angel Smalls is seen in his room today for follow up on his chronic conditions. States he is feeling well. No falls in the past month. Denies pain or complaints. Has behaviors specifically of sexual nature, but these are usually redirectable. BMP was reviewed from 5/22  Past Medical History:   Diagnosis Date    CAD (coronary artery disease)     CHF (congestive heart failure) (HCC)     Hx of blood clots     groin    Hypertension     Paroxysmal atrial fibrillation ([facility] [address] 10/24/2014    S/P ICD (internal cardiac defibrillator) procedure: 10/16/2014: Medtronic Dual Chamber. 10/16/2014    10/16/2014: Medtronic Dual Chamber.  Dr. Korey Erickson       Past Surgical History:   Procedure Laterality Date    CARDIAC CATHETERIZATION  7/9/14    COLONOSCOPY      x2    JOINT REPLACEMENT Bilateral     Hip replacement    OTHER SURGICAL HISTORY      mole removed       Family History   Problem Relation Age of Onset    Heart Disease Mother     Cancer Sister     Heart Disease Father        Social History     Tobacco Use    Smoking status: Never Smoker    Smokeless tobacco: Never Used   Substance Use Topics    Alcohol use: No      Current Outpatient Medications   Medication Sig Dispense Refill    divalproex (DEPAKOTE ER) 250 MG extended release tablet Take 250 mg by mouth 3 times daily      sertraline (ZOLOFT) 50 MG tablet Take 25 mg by mouth daily       terazosin (HYTRIN) 5 MG capsule Take 1 capsule by mouth nightly 30 capsule 3    potassium chloride (KLOR-CON M) 10 MEQ extended release tablet Take 2 tablets by mouth 2 times daily 180 tablet 5    bumetanide (BUMEX) 1 MG tablet Take 1 mg by mouth 2 times daily      donepezil (ARICEPT) 5 MG tablet Take 5 mg by mouth nightly      aspirin 81 MG EC tablet Take 81 mg by mouth daily      pantoprazole (PROTONIX) 40 MG tablet Take 1 tablet by mouth daily 30 tablet 3    Iron TABS Take 65 mg by mouth daily. No current facility-administered medications for this visit. No Known Allergies    Health Maintenance   Topic Date Due    DTaP/Tdap/Td vaccine (1 - Tdap) 01[birth year]15/1955    Shingles Vaccine (1 of 2) 01/15/1986    Pneumococcal 65+ years Vaccine (1 of 2 - PCV13) 01/15/2001    Flu vaccine (Season Ended) 09/01/2019    Potassium monitoring  05/03/2020    Creatinine monitoring  05/03/2020       Subjective:      Review of Systems   Constitutional: Negative for chills, fatigue and fever. HENT: Positive for hearing loss (hearing aide at all times). Negative for congestion, rhinorrhea and sore throat. Eyes: Positive for visual disturbance. Negative for redness. Respiratory: Negative for cough, shortness of breath and wheezing. Cardiovascular: Negative for chest pain and leg swelling. Gastrointestinal: Negative for constipation, diarrhea, nausea and vomiting. Endocrine: Negative for polydipsia and polyuria. Genitourinary: Negative for dysuria, frequency and hematuria. Musculoskeletal: Positive for gait problem (uses walker for ambulation). Negative for arthralgias and myalgias. Skin: Negative for rash and wound. Allergic/Immunologic: Negative for environmental allergies and immunocompromised state. Neurological: Negative for dizziness, light-headedness and headaches. Hematological: Does not bruise/bleed easily. Psychiatric/Behavioral: Positive for behavioral problems, confusion and decreased concentration. Negative for dysphoric mood and sleep disturbance. The patient is not nervous/anxious. Objective:     Physical Exam   Constitutional: He appears well-developed and well-nourished. No distress. HENT:   Head: Normocephalic and atraumatic.    Right Ear: External ear normal.   Left Ear: External ear normal.   Nose: Nose normal.   Mouth/Throat: Benign prostatic hyperplasia with urinary hesitancy     5. Hypertension     6. Gastroesophageal reflux disease without esophagitis     7. Dementia with behavioral disturbance, unspecified dementia type     8. Anemia, unspecified type     9. Other depression         Plan:     1. A-fib--chronic and stable; continue Metoprolol and ASA. Unable to use anticoagulants due to falls. No falls in the past month. 2.  CHF--chronic; continue Bumex, potassium,and MARCELO diet. Potassium Labs stable. 3.  ASHD--chronic and stable; continue ASA. No s/s.   4. BPH--chronic and stable; continue Terazosin  5. HTN--chronic and stable; continue Metoprolol, Bumex and Potassium. 6.  GERD--chronic and stable; continue Pantoprazole  7. Dementia--chronic; continue Aricept and depakote. Has behaviors but these are usually redirectable. 8. Anemia- Chronic and stable. Ferrous sulfate. labs stable. 9. Depression - Continue Zoloft. Mood stable.         Electronically signed by SARAHI Yarbrough CNP on 6/19/2019 at 12:44 PM

## 2019-07-03 ENCOUNTER — PROCEDURE VISIT (OUTPATIENT)
Dept: CARDIOLOGY CLINIC | Age: 84
End: 2019-07-03
Payer: MEDICARE

## 2019-07-03 DIAGNOSIS — Z95.810 S/P ICD (INTERNAL CARDIAC DEFIBRILLATOR) PROCEDURE: ICD-10-CM

## 2019-07-03 DIAGNOSIS — I50.20 SYSTOLIC CONGESTIVE HEART FAILURE, UNSPECIFIED HF CHRONICITY (HCC): Primary | ICD-10-CM

## 2019-07-03 PROCEDURE — 93297 REM INTERROG DEV EVAL ICPMS: CPT | Performed by: INTERNAL MEDICINE

## 2019-07-03 PROCEDURE — 93299 PR REM INTERROG ICPMS/SCRMS <30 D TECH REVIEW: CPT | Performed by: INTERNAL MEDICINE

## 2019-07-17 ENCOUNTER — OUTSIDE SERVICES (OUTPATIENT)
Dept: FAMILY MEDICINE CLINIC | Age: 84
End: 2019-07-17
Payer: MEDICARE

## 2019-07-17 VITALS
WEIGHT: 167.5 LBS | HEART RATE: 89 BPM | DIASTOLIC BLOOD PRESSURE: 80 MMHG | BODY MASS INDEX: 25.47 KG/M2 | RESPIRATION RATE: 16 BRPM | OXYGEN SATURATION: 92 % | SYSTOLIC BLOOD PRESSURE: 130 MMHG | TEMPERATURE: 97.7 F

## 2019-07-17 DIAGNOSIS — R39.11 BENIGN PROSTATIC HYPERPLASIA WITH URINARY HESITANCY: ICD-10-CM

## 2019-07-17 DIAGNOSIS — I25.10 ASHD (ARTERIOSCLEROTIC HEART DISEASE): ICD-10-CM

## 2019-07-17 DIAGNOSIS — N40.1 BENIGN PROSTATIC HYPERPLASIA WITH URINARY HESITANCY: ICD-10-CM

## 2019-07-17 DIAGNOSIS — F32.89 OTHER DEPRESSION: ICD-10-CM

## 2019-07-17 DIAGNOSIS — I10 UNCONTROLLED HYPERTENSION: ICD-10-CM

## 2019-07-17 DIAGNOSIS — I48.20 CHRONIC ATRIAL FIBRILLATION (HCC): Primary | ICD-10-CM

## 2019-07-17 DIAGNOSIS — I50.20 SYSTOLIC CONGESTIVE HEART FAILURE, UNSPECIFIED HF CHRONICITY (HCC): ICD-10-CM

## 2019-07-17 DIAGNOSIS — K21.9 GASTROESOPHAGEAL REFLUX DISEASE WITHOUT ESOPHAGITIS: ICD-10-CM

## 2019-07-17 DIAGNOSIS — F03.91 DEMENTIA WITH BEHAVIORAL DISTURBANCE, UNSPECIFIED DEMENTIA TYPE: ICD-10-CM

## 2019-07-17 DIAGNOSIS — D64.9 ANEMIA, UNSPECIFIED TYPE: ICD-10-CM

## 2019-07-17 PROCEDURE — 99309 SBSQ NF CARE MODERATE MDM 30: CPT | Performed by: NURSE PRACTITIONER

## 2019-07-17 ASSESSMENT — ENCOUNTER SYMPTOMS
DIARRHEA: 0
SHORTNESS OF BREATH: 0
RHINORRHEA: 0
SORE THROAT: 0
EYE REDNESS: 0
WHEEZING: 0
VOMITING: 0
COUGH: 0
CONSTIPATION: 0
NAUSEA: 0

## 2019-08-07 ENCOUNTER — PROCEDURE VISIT (OUTPATIENT)
Dept: CARDIOLOGY CLINIC | Age: 84
End: 2019-08-07
Payer: MEDICARE

## 2019-08-07 DIAGNOSIS — Z95.810 S/P ICD (INTERNAL CARDIAC DEFIBRILLATOR) PROCEDURE: Primary | ICD-10-CM

## 2019-08-07 PROCEDURE — 93295 DEV INTERROG REMOTE 1/2/MLT: CPT | Performed by: INTERNAL MEDICINE

## 2019-08-07 PROCEDURE — 93296 REM INTERROG EVL PM/IDS: CPT | Performed by: INTERNAL MEDICINE

## 2019-08-08 ENCOUNTER — OUTSIDE SERVICES (OUTPATIENT)
Dept: FAMILY MEDICINE CLINIC | Age: 84
End: 2019-08-08
Payer: MEDICARE

## 2019-08-08 VITALS
OXYGEN SATURATION: 91 % | WEIGHT: 166.8 LBS | RESPIRATION RATE: 16 BRPM | BODY MASS INDEX: 25.36 KG/M2 | SYSTOLIC BLOOD PRESSURE: 128 MMHG | TEMPERATURE: 97.9 F | HEART RATE: 77 BPM | DIASTOLIC BLOOD PRESSURE: 82 MMHG

## 2019-08-08 DIAGNOSIS — F32.89 OTHER DEPRESSION: ICD-10-CM

## 2019-08-08 DIAGNOSIS — N40.1 BENIGN PROSTATIC HYPERPLASIA WITH URINARY HESITANCY: ICD-10-CM

## 2019-08-08 DIAGNOSIS — I10 UNCONTROLLED HYPERTENSION: ICD-10-CM

## 2019-08-08 DIAGNOSIS — F03.91 DEMENTIA WITH BEHAVIORAL DISTURBANCE, UNSPECIFIED DEMENTIA TYPE: ICD-10-CM

## 2019-08-08 DIAGNOSIS — K21.9 GASTROESOPHAGEAL REFLUX DISEASE WITHOUT ESOPHAGITIS: ICD-10-CM

## 2019-08-08 DIAGNOSIS — I25.10 ASHD (ARTERIOSCLEROTIC HEART DISEASE): ICD-10-CM

## 2019-08-08 DIAGNOSIS — D64.9 ANEMIA, UNSPECIFIED TYPE: ICD-10-CM

## 2019-08-08 DIAGNOSIS — I48.20 CHRONIC ATRIAL FIBRILLATION (HCC): Primary | ICD-10-CM

## 2019-08-08 DIAGNOSIS — R39.11 BENIGN PROSTATIC HYPERPLASIA WITH URINARY HESITANCY: ICD-10-CM

## 2019-08-08 DIAGNOSIS — I50.20 SYSTOLIC CONGESTIVE HEART FAILURE, UNSPECIFIED HF CHRONICITY (HCC): ICD-10-CM

## 2019-08-08 PROCEDURE — 99309 SBSQ NF CARE MODERATE MDM 30: CPT | Performed by: NURSE PRACTITIONER

## 2019-08-08 ASSESSMENT — ENCOUNTER SYMPTOMS
EYE REDNESS: 0
SHORTNESS OF BREATH: 0
WHEEZING: 0
SORE THROAT: 0
NAUSEA: 0
DIARRHEA: 0
CONSTIPATION: 0
RHINORRHEA: 0
COUGH: 0
VOMITING: 0

## 2019-08-08 NOTE — PROGRESS NOTES
Sohail Chen is a 81 y.o. male who presents today for his medical conditions/complaints as noted below. Chief Complaint   Patient presents with    1 Month Follow-Up    Dementia           HPI:     Lorena Gonzalez is seen in his today for follow up on his chronic conditions. States he is feeling well. No falls in the past month. Denies pain or complaints. Has chronic behaviors. No s/s of behaviors today as he was seen in his room today. He does ambulate in the halls multiple times per day. He is very [place]. Past Medical History:   Diagnosis Date    CAD (coronary artery disease)     CHF (congestive heart failure) ([facility])     Hx of blood clots     groin    Hypertension     Paroxysmal atrial fibrillation ([address] 10/24/2014    S/P ICD (internal cardiac defibrillator) procedure: 10/16/2014: Medtronic Dual Chamber. 10/16/2014    10/16/2014: Medtronic Dual Chamber.  Dr. Junito Erickson       Past Surgical History:   Procedure Laterality Date    CARDIAC CATHETERIZATION  7/9/14    COLONOSCOPY      x2    JOINT REPLACEMENT Bilateral     Hip replacement    OTHER SURGICAL HISTORY      mole removed       Family History   Problem Relation Age of Onset    Heart Disease Mother     Cancer Sister     Heart Disease Father        Social History     Tobacco Use    Smoking status: Never Smoker    Smokeless tobacco: Never Used   Substance Use Topics    Alcohol use: No      Current Outpatient Medications   Medication Sig Dispense Refill    divalproex (DEPAKOTE ER) 250 MG extended release tablet Take 250 mg by mouth 3 times daily      sertraline (ZOLOFT) 50 MG tablet Take 25 mg by mouth daily       terazosin (HYTRIN) 5 MG capsule Take 1 capsule by mouth nightly 30 capsule 3    potassium chloride (KLOR-CON M) 10 MEQ extended release tablet Take 2 tablets by mouth 2 times daily 180 tablet 5    bumetanide (BUMEX) 1 MG tablet Take 1 mg by mouth 2 times daily      donepezil (ARICEPT) 5 MG tablet Take 5 mg by mouth nightly      aspirin hearing is noted. Left Ear: External ear normal. Decreased hearing is noted. Nose: Nose normal.   Mouth/Throat: Oropharynx is clear and moist and mucous membranes are normal.   Eyes: Conjunctivae and EOM are normal. Right eye exhibits no discharge. Left eye exhibits no discharge. No scleral icterus. Neck: Neck supple. No JVD present. No thyromegaly present. Cardiovascular: Normal rate, regular rhythm, normal heart sounds and intact distal pulses. Pulmonary/Chest: Effort normal and breath sounds normal. No stridor. No respiratory distress. He has no wheezes. He has no rales. Abdominal: Soft. Bowel sounds are normal. He exhibits no distension. There is no tenderness. Musculoskeletal: Normal range of motion. He exhibits no edema or deformity. Right shoulder: He exhibits no tenderness, no bony tenderness and no pain. Left shoulder: He exhibits no tenderness, no bony tenderness and no pain. Cervical back: He exhibits no tenderness, no bony tenderness and no pain. Thoracic back: He exhibits no tenderness, no bony tenderness, no pain and no spasm. Lumbar back: He exhibits no tenderness, no bony tenderness and no pain. Lymphadenopathy:     He has no cervical adenopathy. Right: No supraclavicular adenopathy present. Left: No supraclavicular adenopathy present. Neurological: He is alert. He is disoriented. He displays no atrophy. He exhibits normal muscle tone. He displays no seizure activity. Skin: Skin is warm, dry and intact. No rash noted. No erythema. Psychiatric: His speech is normal. His affect is labile. He is aggressive (at times per staff interview). Cognition and memory are impaired. He expresses impulsivity. Nursing note and vitals reviewed. /82   Pulse 77   Temp 97.9 °F (36.6 °C)   Resp 16   Wt 166 lb 12.8 oz (75.7 kg)   SpO2 91%   BMI 25.36 kg/m²     Assessment:       Diagnosis Orders   1.  Chronic atrial fibrillation (HCC)

## 2019-09-11 ENCOUNTER — PROCEDURE VISIT (OUTPATIENT)
Dept: CARDIOLOGY CLINIC | Age: 84
End: 2019-09-11
Payer: MEDICARE

## 2019-09-11 DIAGNOSIS — I50.22 CHRONIC SYSTOLIC HEART FAILURE (HCC): Primary | ICD-10-CM

## 2019-09-11 PROCEDURE — 93299 PR REM INTERROG ICPMS/SCRMS <30 D TECH REVIEW: CPT | Performed by: INTERNAL MEDICINE

## 2019-09-11 PROCEDURE — 93297 REM INTERROG DEV EVAL ICPMS: CPT | Performed by: INTERNAL MEDICINE

## 2019-09-18 ENCOUNTER — OUTSIDE SERVICES (OUTPATIENT)
Dept: FAMILY MEDICINE CLINIC | Age: 84
End: 2019-09-18
Payer: MEDICARE

## 2019-09-18 VITALS
TEMPERATURE: 97.8 F | OXYGEN SATURATION: 95 % | HEART RATE: 67 BPM | BODY MASS INDEX: 24.48 KG/M2 | SYSTOLIC BLOOD PRESSURE: 126 MMHG | DIASTOLIC BLOOD PRESSURE: 70 MMHG | RESPIRATION RATE: 16 BRPM | WEIGHT: 161 LBS

## 2019-09-18 DIAGNOSIS — R39.11 BENIGN PROSTATIC HYPERPLASIA WITH URINARY HESITANCY: ICD-10-CM

## 2019-09-18 DIAGNOSIS — N40.1 BENIGN PROSTATIC HYPERPLASIA WITH URINARY HESITANCY: ICD-10-CM

## 2019-09-18 DIAGNOSIS — F03.91 DEMENTIA WITH BEHAVIORAL DISTURBANCE, UNSPECIFIED DEMENTIA TYPE: ICD-10-CM

## 2019-09-18 DIAGNOSIS — I25.10 ASHD (ARTERIOSCLEROTIC HEART DISEASE): ICD-10-CM

## 2019-09-18 DIAGNOSIS — I10 UNCONTROLLED HYPERTENSION: ICD-10-CM

## 2019-09-18 DIAGNOSIS — I50.20 SYSTOLIC CONGESTIVE HEART FAILURE, UNSPECIFIED HF CHRONICITY (HCC): ICD-10-CM

## 2019-09-18 DIAGNOSIS — I48.20 CHRONIC ATRIAL FIBRILLATION (HCC): Primary | ICD-10-CM

## 2019-09-18 DIAGNOSIS — K21.9 GASTROESOPHAGEAL REFLUX DISEASE WITHOUT ESOPHAGITIS: ICD-10-CM

## 2019-09-18 DIAGNOSIS — F32.89 OTHER DEPRESSION: ICD-10-CM

## 2019-09-18 DIAGNOSIS — D64.9 ANEMIA, UNSPECIFIED TYPE: ICD-10-CM

## 2019-09-18 PROCEDURE — 99309 SBSQ NF CARE MODERATE MDM 30: CPT | Performed by: NURSE PRACTITIONER

## 2019-09-18 RX ORDER — ACETAMINOPHEN 500 MG
500 TABLET ORAL 2 TIMES DAILY
COMMUNITY

## 2019-09-18 ASSESSMENT — ENCOUNTER SYMPTOMS
RHINORRHEA: 0
EYE REDNESS: 0
CONSTIPATION: 0
COUGH: 0
DIARRHEA: 0
SHORTNESS OF BREATH: 0
NAUSEA: 0
VOMITING: 0
WHEEZING: 0
SORE THROAT: 0

## 2019-09-18 NOTE — PROGRESS NOTES
daily       terazosin (HYTRIN) 5 MG capsule Take 1 capsule by mouth nightly 30 capsule 3    potassium chloride (KLOR-CON M) 10 MEQ extended release tablet Take 2 tablets by mouth 2 times daily (Patient taking differently: Take 10 mEq by mouth 2 times daily ) 180 tablet 5    bumetanide (BUMEX) 1 MG tablet Take 0.5 mg by mouth 2 times daily       donepezil (ARICEPT) 5 MG tablet Take 10 mg by mouth nightly       aspirin 81 MG EC tablet Take 81 mg by mouth daily      pantoprazole (PROTONIX) 40 MG tablet Take 1 tablet by mouth daily 30 tablet 3    Iron TABS Take 65 mg by mouth daily. No current facility-administered medications for this visit. No Known Allergies    Health Maintenance   Topic Date Due    DTaP/Tdap/Td vaccine (1 - Tdap) 01/15/1954    Shingles Vaccine (1 of 2) 01/15/1986    Pneumococcal 65+ years Vaccine (1 of 2 - PCV13) 01/15/2001    Annual Wellness Visit (AWV)  06/19/2019    Flu vaccine (1) 09/01/2019    Potassium monitoring  05/03/2020    Creatinine monitoring  05/03/2020       Subjective:      Review of Systems   Constitutional: Negative for chills, fatigue and fever. HENT: Positive for hearing loss (hearing aide at all times, except when in bed.). Negative for congestion, rhinorrhea and sore throat. Eyes: Positive for visual disturbance. Negative for redness. Respiratory: Negative for cough, shortness of breath and wheezing. Cardiovascular: Negative for chest pain and leg swelling. Gastrointestinal: Negative for constipation, diarrhea, nausea and vomiting. Endocrine: Negative for polydipsia and polyuria. Genitourinary: Negative for dysuria, frequency and hematuria. Musculoskeletal: Positive for gait problem (uses walker for ambulation, 2 falls in the past month). Negative for arthralgias and myalgias. Skin: Negative for rash and wound. Allergic/Immunologic: Negative for environmental allergies and immunocompromised state.    Neurological: Negative for dizziness, light-headedness and headaches. Hematological: Does not bruise/bleed easily. Psychiatric/Behavioral: Positive for agitation (at times), behavioral problems, confusion, decreased concentration and dysphoric mood. Negative for sleep disturbance. The patient is not nervous/anxious. Objective:     Physical Exam   Constitutional: He appears well-developed and well-nourished. No distress. HENT:   Head: Normocephalic and atraumatic. Right Ear: External ear normal. Decreased hearing is noted. Left Ear: External ear normal. Decreased hearing is noted. Nose: Nose normal.   Mouth/Throat: Oropharynx is clear and moist and mucous membranes are normal.   Eyes: Conjunctivae and EOM are normal. Right eye exhibits no discharge. Left eye exhibits no discharge. No scleral icterus. Neck: Neck supple. No JVD present. No thyromegaly present. Cardiovascular: Normal rate, regular rhythm, normal heart sounds and intact distal pulses. Pulmonary/Chest: Effort normal. No stridor. No respiratory distress. He has decreased breath sounds in the right lower field and the left lower field. He has no wheezes. He has no rales. Abdominal: Soft. Bowel sounds are normal. He exhibits no distension. There is no tenderness. Musculoskeletal: Normal range of motion. He exhibits no edema or deformity. Right shoulder: He exhibits no tenderness, no bony tenderness and no pain. Left shoulder: He exhibits no tenderness, no bony tenderness and no pain. Cervical back: He exhibits no tenderness, no bony tenderness and no pain. Thoracic back: He exhibits no tenderness, no bony tenderness, no pain and no spasm. Lumbar back: He exhibits no tenderness, no bony tenderness and no pain. Lymphadenopathy:     He has no cervical adenopathy. Right: No supraclavicular adenopathy present. Left: No supraclavicular adenopathy present. Neurological: He is alert. He is disoriented.  He displays no atrophy. He exhibits normal muscle tone. He displays no seizure activity. Skin: Skin is warm, dry and intact. No rash noted. No erythema. Psychiatric: His speech is normal. His affect is labile. He is aggressive (at times per staff interview). Cognition and memory are impaired. He expresses impulsivity. Nursing note and vitals reviewed. /70   Pulse 67   Temp 97.8 °F (36.6 °C)   Resp 16   Wt 161 lb (73 kg)   SpO2 95%   BMI 24.48 kg/m²     Assessment:       Diagnosis Orders   1. Chronic atrial fibrillation ([facility] [address] [address]     2. Systolic congestive heart failure, unspecified HF chronicity ([facility] [address]     3. ASHD (arteriosclerotic heart disease)     4. Benign prostatic hyperplasia with urinary hesitancy     5. Hypertension     6. Gastroesophageal reflux disease without esophagitis     7. Dementia with behavioral disturbance, unspecified dementia type     8. Anemia, unspecified type     9. Other depression         Plan:     1. A-fib--chronic and stable; continue ASA. Unable to use anticoagulants due to falls. 2 falls in the pat month. Rate controlled without use of BB or amiodarone. 2.  CHF--chronic; continue Bumex, potassium,and MARCELO diet. Bumex recently changed related to change in labs. 3.  ASHD--chronic and stable; continue ASA. No s/s.    4. BPH--chronic and stable; continue Terazosin. Recent infection and treatment. 5. HTN--chronic and stable; continue Bumex and Potassium. 6.  GERD--chronic and stable; continue Pantoprazole  7. Dementia--chronic; continue Aricept and depakote. Recent increase in Aricept. Continue to monitor. 8. Anemia- Chronic and stable. Ferrous sulfate. 9. Depression - Continue Zoloft, seems more reclusive. Recent increase. Will continue to monitor and may increase again in the future.         Electronically signed by SARAHI Luke CNP on 9/18/2019 at 11:28 AM

## 2019-10-16 ENCOUNTER — OUTSIDE SERVICES (OUTPATIENT)
Dept: FAMILY MEDICINE CLINIC | Age: 84
End: 2019-10-16
Payer: MEDICARE

## 2019-10-16 ENCOUNTER — PROCEDURE VISIT (OUTPATIENT)
Dept: CARDIOLOGY CLINIC | Age: 84
End: 2019-10-16
Payer: MEDICARE

## 2019-10-16 VITALS
HEART RATE: 98 BPM | WEIGHT: 161 LBS | SYSTOLIC BLOOD PRESSURE: 138 MMHG | OXYGEN SATURATION: 96 % | DIASTOLIC BLOOD PRESSURE: 74 MMHG | TEMPERATURE: 98 F | RESPIRATION RATE: 18 BRPM | BODY MASS INDEX: 24.48 KG/M2

## 2019-10-16 DIAGNOSIS — D64.9 ANEMIA, UNSPECIFIED TYPE: ICD-10-CM

## 2019-10-16 DIAGNOSIS — I10 UNCONTROLLED HYPERTENSION: ICD-10-CM

## 2019-10-16 DIAGNOSIS — I50.20 SYSTOLIC CONGESTIVE HEART FAILURE, UNSPECIFIED HF CHRONICITY (HCC): ICD-10-CM

## 2019-10-16 DIAGNOSIS — I25.10 ASHD (ARTERIOSCLEROTIC HEART DISEASE): ICD-10-CM

## 2019-10-16 DIAGNOSIS — I48.20 CHRONIC ATRIAL FIBRILLATION (HCC): Primary | ICD-10-CM

## 2019-10-16 DIAGNOSIS — R39.11 BENIGN PROSTATIC HYPERPLASIA WITH URINARY HESITANCY: ICD-10-CM

## 2019-10-16 DIAGNOSIS — N40.1 BENIGN PROSTATIC HYPERPLASIA WITH URINARY HESITANCY: ICD-10-CM

## 2019-10-16 DIAGNOSIS — F32.89 OTHER DEPRESSION: ICD-10-CM

## 2019-10-16 DIAGNOSIS — F03.91 DEMENTIA WITH BEHAVIORAL DISTURBANCE, UNSPECIFIED DEMENTIA TYPE: ICD-10-CM

## 2019-10-16 DIAGNOSIS — K21.9 GASTROESOPHAGEAL REFLUX DISEASE WITHOUT ESOPHAGITIS: ICD-10-CM

## 2019-10-16 DIAGNOSIS — I50.20 SYSTOLIC CONGESTIVE HEART FAILURE, UNSPECIFIED HF CHRONICITY (HCC): Primary | ICD-10-CM

## 2019-10-16 DIAGNOSIS — Z95.810 S/P ICD (INTERNAL CARDIAC DEFIBRILLATOR) PROCEDURE: ICD-10-CM

## 2019-10-16 PROCEDURE — 99309 SBSQ NF CARE MODERATE MDM 30: CPT | Performed by: NURSE PRACTITIONER

## 2019-10-16 PROCEDURE — 93299 PR REM INTERROG ICPMS/SCRMS <30 D TECH REVIEW: CPT | Performed by: INTERNAL MEDICINE

## 2019-10-16 PROCEDURE — 93297 REM INTERROG DEV EVAL ICPMS: CPT | Performed by: INTERNAL MEDICINE

## 2019-10-16 RX ORDER — MIRTAZAPINE 15 MG/1
7.5 TABLET, FILM COATED ORAL NIGHTLY
COMMUNITY

## 2019-10-16 ASSESSMENT — ENCOUNTER SYMPTOMS
CONSTIPATION: 0
DIARRHEA: 0
NAUSEA: 0
WHEEZING: 0
EYE REDNESS: 0
VOMITING: 0
COUGH: 0
SORE THROAT: 0
RHINORRHEA: 0
SHORTNESS OF BREATH: 0

## 2019-11-01 ENCOUNTER — OUTSIDE SERVICES (OUTPATIENT)
Dept: FAMILY MEDICINE CLINIC | Age: 84
End: 2019-11-01
Payer: MEDICARE

## 2019-11-01 VITALS
RESPIRATION RATE: 20 BRPM | WEIGHT: 161 LBS | DIASTOLIC BLOOD PRESSURE: 68 MMHG | OXYGEN SATURATION: 92 % | SYSTOLIC BLOOD PRESSURE: 132 MMHG | HEART RATE: 74 BPM | TEMPERATURE: 97.9 F | BODY MASS INDEX: 24.48 KG/M2

## 2019-11-01 DIAGNOSIS — J22 LOWER RESP. TRACT INFECTION: Primary | ICD-10-CM

## 2019-11-01 PROCEDURE — 99308 SBSQ NF CARE LOW MDM 20: CPT | Performed by: NURSE PRACTITIONER

## 2019-11-01 ASSESSMENT — ENCOUNTER SYMPTOMS
COUGH: 1
VOMITING: 0
SHORTNESS OF BREATH: 1

## 2019-11-04 ENCOUNTER — TELEPHONE (OUTPATIENT)
Dept: CARDIOLOGY CLINIC | Age: 84
End: 2019-11-04